# Patient Record
Sex: MALE | Race: WHITE | NOT HISPANIC OR LATINO | Employment: FULL TIME | URBAN - METROPOLITAN AREA
[De-identification: names, ages, dates, MRNs, and addresses within clinical notes are randomized per-mention and may not be internally consistent; named-entity substitution may affect disease eponyms.]

---

## 2023-11-02 ENCOUNTER — PATIENT OUTREACH (OUTPATIENT)
Dept: CARE COORDINATION | Facility: CLINIC | Age: 53
End: 2023-11-02
Payer: MEDICARE

## 2023-11-02 ENCOUNTER — DOCUMENTATION (OUTPATIENT)
Dept: PRIMARY CARE | Facility: CLINIC | Age: 53
End: 2023-11-02
Payer: MEDICARE

## 2023-11-02 RX ORDER — AMLODIPINE BESYLATE 2.5 MG/1
2.5 TABLET ORAL DAILY
COMMUNITY
End: 2023-11-14 | Stop reason: ALTCHOICE

## 2023-11-02 RX ORDER — ATORVASTATIN CALCIUM 40 MG/1
40 TABLET, FILM COATED ORAL DAILY
COMMUNITY

## 2023-11-02 NOTE — PROGRESS NOTES
Discharge Facility:[facility]  Discharge Diagnosis:  Cervical Spondylosis w/cervical stenosis, radiculopathy  HTN  Tobacco Abuse  Depression  Obesity    Admission Date:10/30/2023  Discharge Date: 11/1/2023    PCP Appointment Date:TBD  Specialist Appointment Date: D  Hospital Encounter and Summary:  not available at this time  See discharge assessment below for further details  Engagement  Call Start Time: 1534 (11/2/2023  3:41 PM)    Medications  Medications reviewed with patient/caregiver?: Yes (Amlodipine 2.5 mg one qd, Atorvastatin 40 mg one qd) (11/2/2023  3:41 PM)  Is the patient having any side effects they believe may be caused by any medication additions or changes?: No (11/2/2023  3:41 PM)  Does the patient have all medications ordered at discharge?: Yes (11/2/2023  3:41 PM)  Care Management Interventions: No intervention needed (11/2/2023  3:41 PM)  Is the patient taking all medications as directed (includes completed medication regime)?: Yes (11/2/2023  3:41 PM)    Appointments  Does the patient have a primary care provider?: Yes (11/2/2023  3:41 PM)  Has the patient kept scheduled appointments due by today?: Yes (11/2/2023  3:41 PM)    Patient Teaching  Does the patient have access to their discharge instructions?: Yes (11/2/2023  3:41 PM)  What is the patient's perception of their health status since discharge?: Improving (11/2/2023  3:41 PM)  Is the patient/caregiver able to teach back the hierarchy of who to call/visit for symptoms/problems? PCP, Specialist, Home Health nurse, Urgent Care, ED, 911: Yes (11/2/2023  3:41 PM)  If the patient is a current smoker, are they able to teach back resources for cessation?: [id number] (We discussed to try to cut back one every 5 or so days and merle on calendar to see progress, will think about) (11/2/2023  3:41 PM)    Wrap Up  Wrap Up Additional Comments: -- (Cb dealing with trying to with multiple addictions currently. He is giving recovery a try.  He will fu with pcp.) (11/2/2023  3:41 PM)

## 2023-11-07 ENCOUNTER — TELEPHONE (OUTPATIENT)
Dept: PRIMARY CARE | Facility: CLINIC | Age: 53
End: 2023-11-07
Payer: COMMERCIAL

## 2023-11-07 NOTE — TELEPHONE ENCOUNTER
----- Message from Brigitte Casanova MA sent at 11/2/2023  3:57 PM EDT -----  Regarding: TCM Needs Appointment  This patient was discharged from: [facility]  Discharge diagnosis:   Cervical Spondylosis w/cervical stenosis, radiculopathy, HTN, Tobacco Abuse, Depression, Obesity  Date of discharge: 11/1/2023       No PCP appointments available within 14 days of discharge.   Please reach out to patient and schedule an appointment by 11/15/2023    Thank You!

## 2023-11-14 ENCOUNTER — OFFICE VISIT (OUTPATIENT)
Dept: PRIMARY CARE | Facility: CLINIC | Age: 53
End: 2023-11-14
Payer: MEDICARE

## 2023-11-14 VITALS — HEART RATE: 88 BPM | HEIGHT: 72 IN | SYSTOLIC BLOOD PRESSURE: 140 MMHG | DIASTOLIC BLOOD PRESSURE: 84 MMHG

## 2023-11-14 DIAGNOSIS — F17.200 SMOKER: ICD-10-CM

## 2023-11-14 DIAGNOSIS — G45.9 TIA (TRANSIENT ISCHEMIC ATTACK): Primary | ICD-10-CM

## 2023-11-14 DIAGNOSIS — E78.2 MIXED HYPERLIPIDEMIA: ICD-10-CM

## 2023-11-14 DIAGNOSIS — M48.02 CERVICAL STENOSIS OF SPINAL CANAL: ICD-10-CM

## 2023-11-14 DIAGNOSIS — F19.10 DRUG ABUSE (MULTI): ICD-10-CM

## 2023-11-14 DIAGNOSIS — Z00.00 WELLNESS EXAMINATION: ICD-10-CM

## 2023-11-14 PROCEDURE — 99213 OFFICE O/P EST LOW 20 MIN: CPT | Performed by: INTERNAL MEDICINE

## 2023-11-14 PROCEDURE — 4004F PT TOBACCO SCREEN RCVD TLK: CPT | Performed by: INTERNAL MEDICINE

## 2023-11-14 RX ORDER — TAMSULOSIN HYDROCHLORIDE 0.4 MG/1
0.4 CAPSULE ORAL EVERY 24 HOURS
COMMUNITY
Start: 2016-09-14 | End: 2023-11-14 | Stop reason: ALTCHOICE

## 2023-11-14 RX ORDER — VENLAFAXINE HYDROCHLORIDE 150 MG/1
150 CAPSULE, EXTENDED RELEASE ORAL EVERY 24 HOURS
COMMUNITY
Start: 2019-10-30

## 2023-11-14 RX ORDER — AMLODIPINE BESYLATE 2.5 MG/1
2.5 TABLET ORAL
COMMUNITY
Start: 2023-11-02

## 2023-11-14 RX ORDER — ATORVASTATIN CALCIUM 40 MG/1
40 TABLET, FILM COATED ORAL DAILY
COMMUNITY
Start: 2023-11-01 | End: 2023-11-14 | Stop reason: ALTCHOICE

## 2023-11-14 NOTE — PROGRESS NOTES
Subjective   Patient ID: Cb Richardson is a 53 y.o. male who presents for Follow-up (Hospital follow up, pain and numbness on right side of body).    HPI numbness   See [facility] mri  ct cta neg   [facility] has some cervical spine stenosis     Review of Systems    Objective   /84 (BP Location: Left arm, Patient Position: Sitting, BP Cuff Size: Adult)   Pulse 88   Ht 1.829 m (6')     Physical Exam  Obese   Card rrr  Pulm cta  Abd soft nt bs plus     Assessment/Plan   Diagnoses and all orders for this visit:  TIA (transient ischemic attack)  Comments:  see n[facility]ols [facility]  Cervical stenosis of spinal canal  Comments:  will follow try pt  Smoker  Comments:  ongoing  Mixed hyperlipidemia  Comments:  on meds  Drug abuse (CMS/HCC)  Comments:  trying

## 2023-11-16 ENCOUNTER — PATIENT OUTREACH (OUTPATIENT)
Dept: PRIMARY CARE | Facility: CLINIC | Age: 53
End: 2023-11-16
Payer: MEDICARE

## 2023-11-16 NOTE — PROGRESS NOTES
Call regarding appt. with PCP on 11/14/2023 after hospitalization.  At time of outreach call the patient feels as if their condition has improved since last visit.  Reviewed the PCP appointment with the pt and addressed any questions or concerns.  Cb states he is doing pretty good. Taking meds as directed.

## 2024-01-24 ENCOUNTER — PATIENT OUTREACH (OUTPATIENT)
Dept: CARE COORDINATION | Facility: CLINIC | Age: 54
End: 2024-01-24

## 2024-01-24 ENCOUNTER — EVALUATION (OUTPATIENT)
Dept: PHYSICAL THERAPY | Facility: CLINIC | Age: 54
End: 2024-01-24
Payer: MEDICARE

## 2024-01-24 DIAGNOSIS — M48.02 CERVICAL STENOSIS OF SPINAL CANAL: Primary | ICD-10-CM

## 2024-01-24 PROCEDURE — 97110 THERAPEUTIC EXERCISES: CPT | Mod: GP | Performed by: PHYSICAL THERAPIST

## 2024-01-24 PROCEDURE — 97162 PT EVAL MOD COMPLEX 30 MIN: CPT | Mod: GP | Performed by: PHYSICAL THERAPIST

## 2024-01-24 ASSESSMENT — PATIENT HEALTH QUESTIONNAIRE - PHQ9
2. FEELING DOWN, DEPRESSED OR HOPELESS: SEVERAL DAYS
2. FEELING DOWN, DEPRESSED OR HOPELESS: SEVERAL DAYS
1. LITTLE INTEREST OR PLEASURE IN DOING THINGS: NOT AT ALL
10. IF YOU CHECKED OFF ANY PROBLEMS, HOW DIFFICULT HAVE THESE PROBLEMS MADE IT FOR YOU TO DO YOUR WORK, TAKE CARE OF THINGS AT HOME, OR GET ALONG WITH OTHER PEOPLE: SOMEWHAT DIFFICULT
SUM OF ALL RESPONSES TO PHQ9 QUESTIONS 1 AND 2: 1
SUM OF ALL RESPONSES TO PHQ9 QUESTIONS 1 AND 2: 1
1. LITTLE INTEREST OR PLEASURE IN DOING THINGS: NOT AT ALL

## 2024-01-24 ASSESSMENT — ENCOUNTER SYMPTOMS
DEPRESSION: 0
OCCASIONAL FEELINGS OF UNSTEADINESS: 0
LOSS OF SENSATION IN FEET: 0

## 2024-01-24 NOTE — PROGRESS NOTES
Call after hospitalization.  At time of outreach call the patient feels as if their condition has improved since last visit.  Cb states having some legal issues. He went to [facility] today but concerned about the other issues now.

## 2024-01-24 NOTE — PROGRESS NOTES
Physical Therapy    Physical Therapy Evaluation    Patient Name: Cb Richardson  MRN: [id number]  Today's Date: 1/24/2024  Visit #1         Current Problem  Problem List Items Addressed This Visit             ICD-10-CM    Cervical stenosis of spinal canal - Primary M48.02         SUBJECTIVE:  Subjective   General  Reason for Referral: cerv stenosis  Referred By: Kay  Payor: JAVED AGUIRRE [facility] / Plan: [facility] [place] / Product Type: *No Product type* /   Precautions  Precautions  STEADI Fall Risk Score (The score of 4 or more indicates an increased risk of falling): 3  Precautions Comment: none     Pain       Chief complaint: Pt reports symptoms began a couple  months ago he was getting feelings of his entire R side getting numb from his chest down the middle and over.  Numb R UE and LE.  Went to [facility] ER where he was scanned for TIA but found to have cervical stenosis.  Per MRI images he does have disc herniations encroaching on the thecal sac. Pt with reports of having difficulty concentrating and feeling foggy.     Pain ranges: 1-3/10 pain in joints with arthritis maybe but no pain in his neck.   Increases with: sitting and watching TV in bed  Decreases with: sleeping with less pillows  Prior level of function: Ind with ADLs  Current functional level:  Pain limits the patient's ability to concentrate  Home set up: no concerns  Work status: not a working individual    OBJECTIVE  Upper Extremity Strength:  Date: eval RIGHT LEFT   Shoulder Flexion 4 4+   Shoulder Abduction 4 4+   Shoulder ER 4 4+   Shoulder IR 4 4+     Cervical ROM:  Date: eval Percentage    Flexion 35    Extension 55     RIGHT LEFT   Side bend 20  30 pulling on R   Rotation       Joint mobility NT  Posture forward head and rounded shoulders  Palpation NT  Neurological symptoms tingling on R but resolving    Outcome Measures:  Other Measures  Neck Disability Index: 28%     ASSESSMENT  The pt presents with signs and symptoms  consistent with the medical diagnosis of cervical stenosis  The pt has impairments including resolving numbness R UE and LE, pain in multiple joints and functional limitations which include a sedentary lifestyle with poor posture.  The pt will benefit from skilled physical therapy to reduce impairments in order to return to prior level of function.     The physical therapy prognosis is good for the patient to achieve their goals.   The pt tolerated therapy treatment today well with no adverse effects.  Barriers to therapy include:  history of drug abuse, pt has just gotten out  of the rehab facility,     PLAN  The pt will be seen 1 days a week for 4 weeks.  HOWEVER pt scheduled to return to a hearing to determine if he needs to go back to rehab on 1/29/24.  Pt will call to schedule if he is able  Medicare certification period: 1/24/24-3/24/24     The pt has been educated about the risks and benefits of physical therapy including manual therapy treatments and gives consent for treatment.     The patient will benefit from physical therapy treatment to include: therapeutic exercises, therapeutic activities, neurological re-education, manual therapy, modalities, and a home exercise program.     The patient's potential to reach their therapy goals is excellent.     The evaluating therapist is prn and therefore the pt's POC may be re-assessed or discharged by another staff therapist at this location based on scheduling and availability.  Pt is aware and agrees.     TREATMENT:  Therapeutic ex:  Seated cervical AROM  Scap retractions x 20  Cervical retractions x 20  Increased time educating patient about posture and not being sedentary.   Encouraged a walking program total of 30 min per day  Goals:  Active       PT Problem       pt will demonstrate improved understanding of posture and regular mobility       Start:  01/24/24    Expected End:  02/23/24            PT will have no numbness R UE/LE for 2 weeks       Start:   01/24/24    Expected End:  02/23/24            NDI to 14%       Start:  01/24/24    Expected End:  02/23/24

## 2024-01-31 ENCOUNTER — PATIENT OUTREACH (OUTPATIENT)
Dept: PRIMARY CARE | Facility: CLINIC | Age: 54
End: 2024-01-31
Payer: MEDICARE

## 2024-02-10 DIAGNOSIS — N40.1 BENIGN PROSTATIC HYPERPLASIA WITH LOWER URINARY TRACT SYMPTOMS: ICD-10-CM

## 2024-02-12 RX ORDER — TAMSULOSIN HYDROCHLORIDE 0.4 MG/1
0.4 CAPSULE ORAL NIGHTLY
Qty: 90 CAPSULE | Refills: 1 | Status: SHIPPED | OUTPATIENT
Start: 2024-02-12

## 2024-04-03 ENCOUNTER — DOCUMENTATION (OUTPATIENT)
Dept: PHYSICAL THERAPY | Facility: CLINIC | Age: 54
End: 2024-04-03
Payer: MEDICARE

## 2024-04-03 NOTE — PROGRESS NOTES
Physical Therapy    Discharge Summary    Name: Cb Richardson  MRN: [id number]  : 1970  Date: 24    Discharge Summary: PT    Discharge Information:     Therapy Summary: Eval only.  PT never called to schedule more appointments    Discharge Status: unknown    Rehab Discharge Reason: Failed to schedule and/or keep follow-up appointment(s)

## 2024-04-19 ENCOUNTER — DOCUMENTATION (OUTPATIENT)
Dept: PHYSICAL THERAPY | Facility: CLINIC | Age: 54
End: 2024-04-19
Payer: MEDICARE

## 2024-04-25 ENCOUNTER — TELEPHONE (OUTPATIENT)
Dept: PRIMARY CARE | Facility: CLINIC | Age: 54
End: 2024-04-25
Payer: MEDICARE

## 2024-04-25 NOTE — TELEPHONE ENCOUNTER
Pt needs a statement/letter clearing him/ okay for residential treatment at [facility]-for  drug and alcohol.  F: [phone]    Okay to write?

## 2024-06-13 ENCOUNTER — TELEPHONE (OUTPATIENT)
Dept: PRIMARY CARE | Facility: CLINIC | Age: 54
End: 2024-06-13
Payer: MEDICARE

## 2024-06-13 NOTE — TELEPHONE ENCOUNTER
Patient has appt with you June 24 but is requesting a letter stating he needs additional bathroom breaks due to medication.     [facility]

## 2024-06-14 ENCOUNTER — HOSPITAL ENCOUNTER (EMERGENCY)
Facility: HOSPITAL | Age: 54
Discharge: LEFT BEFORE DISCHARGE | End: 2024-06-14
Attending: STUDENT IN AN ORGANIZED HEALTH CARE EDUCATION/TRAINING PROGRAM
Payer: MEDICARE

## 2024-06-14 VITALS
OXYGEN SATURATION: 96 % | TEMPERATURE: 98.1 F | SYSTOLIC BLOOD PRESSURE: 174 MMHG | HEART RATE: 81 BPM | RESPIRATION RATE: 18 BRPM | DIASTOLIC BLOOD PRESSURE: 95 MMHG

## 2024-06-14 DIAGNOSIS — R45.1 AGITATION: Primary | ICD-10-CM

## 2024-06-14 PROCEDURE — 99283 EMERGENCY DEPT VISIT LOW MDM: CPT | Performed by: STUDENT IN AN ORGANIZED HEALTH CARE EDUCATION/TRAINING PROGRAM

## 2024-06-14 PROCEDURE — 36415 COLL VENOUS BLD VENIPUNCTURE: CPT | Performed by: STUDENT IN AN ORGANIZED HEALTH CARE EDUCATION/TRAINING PROGRAM

## 2024-06-14 RX ORDER — IBUPROFEN 200 MG
1 TABLET ORAL DAILY
Status: DISCONTINUED | OUTPATIENT
Start: 2024-06-14 | End: 2024-06-14 | Stop reason: HOSPADM

## 2024-06-14 NOTE — ED PROVIDER NOTES
EMERGENCY MEDICINE EVALUATION NOTE    History of Present Illness     Chief Complaint: No chief complaint on file.      HPI: Cb Richadrson is a 54 y.o. male with past medical history of cocaine and alcohol abuse who presents with complaint of possible need for psych eval. Patient was reportedly at a group home house.  Patient states he was sent in by his PPH after an argument with them.  Patient denies any recent drug use or alcohol use.  History is somewhat limited as patient is agitated.  He otherwise denies any SI or HI.  Denies any visual or auditory hallucinations.  He states he does take Effexor for and has been compliant with this.  He states he does not want to talk to a psychiatric provider and in the left as he wanted to get out of the house.  He has no other complaints at this time and wants to sign out AMA.    Previous History   No past medical history on file.  No past surgical history on file.  Social History     Tobacco Use    Smoking status: Every Day     Current packs/day: 2.00     Types: Cigarettes     Passive exposure: Current    Smokeless tobacco: Never   Substance Use Topics    Alcohol use: Not Currently     Comment: 2 weeks sober    Drug use: Yes     Types: Cocaine, Marijuana     Comment: 1.5 month clean from cocaine, 1 week clean from marijuana     No family history on file.  No Known Allergies  Current Outpatient Medications   Medication Instructions    amLODIPine (NORVASC) 2.5 mg, oral, Daily RT    atorvastatin (LIPITOR) 40 mg, oral, Daily    tamsulosin (FLOMAX) 0.4 mg, oral, Nightly    venlafaxine XR (EFFEXOR-XR) 150 mg, oral, Every 24 hours       Physical Exam     Appearance: Alert, oriented , cooperative,  in acute distress. Well nourished & well hydrated.     Skin: Intact,  dry skin, no lesions, rash, petechiae or purpura.      Eyes: PERRLA, EOMs intact,  Conjunctiva pink with no redness or exudates. Cornea & anterior chamber are clear, Eyelids without lesions. No scleral icterus.       ENT: Hearing grossly intact. External auditory canals patent, tympanic membranes intact with visible landmarks. Nares patent, mucus membranes moist. Dentition without lesions. Pharynx clear, uvula midline.      Neck: Supple, without meningismus. Thyroid not palpable. Trachea at midline. No lymphadenopathy.     Pulmonary: Clear bilaterally with good chest wall excursion. No rales, rhonchi or wheezing. No accessory muscle use or stridor.     Cardiac: Normal S1, S2 without murmur, rub, gallop or extrasystole. No JVD, Carotids without bruits.     Abdomen: Soft, nontender, active bowel sounds.  No palpable organomegaly.  No rebound or guarding.  No CVA tenderness.     Genitourinary: Exam deferred.     Musculoskeletal: Full range of motion. no pain, edema, or deformity. Pulses full and equal. No cyanosis or clubbing.      Neurological:  Cranial nerves II through XII are grossly intact, finger-nose touch is normal, normal sensation, no weakness, no focal findings identified.     Psychiatric: Mildly agitated.     Results     Labs Reviewed   CBC WITH AUTO DIFFERENTIAL   COMPREHENSIVE METABOLIC PANEL   DRUG SCREEN,URINE   ACUTE TOXICOLOGY PANEL, BLOOD     No orders to display         ED Course & Medical Decision Making     Medications   nicotine (Nicoderm CQ) 14 mg/24 hr patch 1 patch (has no administration in time range)     Diagnoses as of 06/14/24 1602   Agitation           Cb Richardson is a 54 y.o. male with past medical history of cocaine and alcohol abuse who presents with complaint of possible need for psych eval. patient without any active SI or HI or reported active auditory visual hallucinations.  No evidence of acute psychosis although he is somewhat agitated on clinical examination.  Patient was adamant that he does not want to speak with any psychiatric provider and refused to remove any of his belongings.  I do not believe he is at risk of harming himself or others at this time and is stable for  discharge home with no evidence of acute psychosis or need for pink slip this time.  Patient ultimately left AMA.    Procedures   Procedures    Diagnosis     1. Agitation        Disposition     This patient has elected to leave against medical advice. In my opinion, the patient has capacity to leave AMA. The patient is clinically sober, free from distracting injury, appears to have intact insight and judgment and reason, and in my opinion has capacity to make decisions. I explained to the patient that these symptoms may represent a serious underlying medical condition  and the patient verbalized understanding of my concerns and understands the consequences of leaving without complete evaluation.    I had a discussion with the patient about their workup and results, and informed the patient what the next step in diagnosis and treatment would be, and they verbalized understanding of this as well. I explained the risks of leaving without further workup or treatment, which included reasonably foreseeable complications such as death, serious injury, and permanent disability. I also offered alternatives to departing AMA such as assigning the patient a different provider or an alternate workup pathway.    I have asked them to return as soon as possible to complete their evaluation, and also explained that they were welcome to return to the ER for further evaluation whenever they choose. I have asked the patient to follow up with their primary doctor as soon as possible. I have answered all their questions.       Jc Schofield DO   Emergency Medicine       ED Prescriptions    None            Jc Schofield DO  06/14/24 1604

## 2024-06-24 ENCOUNTER — APPOINTMENT (OUTPATIENT)
Dept: PRIMARY CARE | Facility: CLINIC | Age: 54
End: 2024-06-24
Payer: MEDICARE

## 2024-06-24 VITALS
WEIGHT: 279.2 LBS | HEART RATE: 78 BPM | BODY MASS INDEX: 39.09 KG/M2 | SYSTOLIC BLOOD PRESSURE: 122 MMHG | OXYGEN SATURATION: 97 % | HEIGHT: 71 IN | TEMPERATURE: 97.3 F | DIASTOLIC BLOOD PRESSURE: 80 MMHG

## 2024-06-24 DIAGNOSIS — E78.2 MIXED HYPERLIPIDEMIA: ICD-10-CM

## 2024-06-24 DIAGNOSIS — F17.200 SMOKER: ICD-10-CM

## 2024-06-24 DIAGNOSIS — F19.10 DRUG ABUSE (MULTI): ICD-10-CM

## 2024-06-24 DIAGNOSIS — M48.02 CERVICAL STENOSIS OF SPINAL CANAL: ICD-10-CM

## 2024-06-24 DIAGNOSIS — Z12.5 PROSTATE CANCER SCREENING: ICD-10-CM

## 2024-06-24 DIAGNOSIS — Z00.00 WELLNESS EXAMINATION: ICD-10-CM

## 2024-06-24 DIAGNOSIS — G45.9 TIA (TRANSIENT ISCHEMIC ATTACK): ICD-10-CM

## 2024-06-24 DIAGNOSIS — M79.662 PAIN OF LEFT CALF: Primary | ICD-10-CM

## 2024-06-24 PROCEDURE — 99213 OFFICE O/P EST LOW 20 MIN: CPT | Performed by: INTERNAL MEDICINE

## 2024-06-24 RX ORDER — CLONIDINE HYDROCHLORIDE 0.1 MG/1
0.1 TABLET ORAL 2 TIMES DAILY
COMMUNITY
Start: 2024-05-16

## 2024-06-24 RX ORDER — VIT B1 MN/B2/B3/B5/B6/B12/C/FA 18-250-400
TABLET ORAL
COMMUNITY
Start: 2024-05-19

## 2024-06-24 NOTE — PROGRESS NOTES
"Subjective   Patient ID: Cb Richardson is a 54 y.o. male who presents for Follow-up (Discuss getting referral to Urologist).    HPI desires colonoscopy   He is back in rehab  Left calf pain   Feels like an ice pick   No clot    Review of Systems    Objective   /80 (BP Location: Left arm, Patient Position: Sitting, BP Cuff Size: Large adult)   Pulse 78   Temp 36.3 °C (97.3 °F)   Ht 1.803 m (5' 11\")   Wt 127 kg (279 lb 3.2 oz)   SpO2 97%   BMI 38.94 kg/m²     Physical Exam  NAD  CARD RRR  PULM CTA  ABD NEG   EXT  NL    Assessment/Plan   Diagnoses and all orders for this visit:  Pain of left calf  Comments:  stretch  Mixed hyperlipidemia  Comments:  on meds  Smoker  Cervical stenosis of spinal canal  Comments:  ongoing  TIA (transient ischemic attack)  Comments:  on meds  Drug abuse (Multi)  Comments:  ongoing         "

## 2024-08-20 DIAGNOSIS — G45.9 TIA (TRANSIENT ISCHEMIC ATTACK): Primary | ICD-10-CM

## 2024-08-20 DIAGNOSIS — N40.1 BENIGN PROSTATIC HYPERPLASIA WITH LOWER URINARY TRACT SYMPTOMS: ICD-10-CM

## 2024-08-20 DIAGNOSIS — F17.200 SMOKER: Primary | ICD-10-CM

## 2024-08-20 DIAGNOSIS — M48.02 CERVICAL STENOSIS OF SPINAL CANAL: ICD-10-CM

## 2024-08-20 DIAGNOSIS — E78.2 MIXED HYPERLIPIDEMIA: ICD-10-CM

## 2024-08-20 RX ORDER — ALBUTEROL SULFATE 90 UG/1
2 INHALANT RESPIRATORY (INHALATION) EVERY 4 HOURS PRN
Qty: 24 G | Refills: 1 | Status: SHIPPED | OUTPATIENT
Start: 2024-08-20 | End: 2025-08-20

## 2024-08-20 RX ORDER — VENLAFAXINE HYDROCHLORIDE 150 MG/1
150 CAPSULE, EXTENDED RELEASE ORAL EVERY 24 HOURS
Qty: 90 CAPSULE | Refills: 1 | Status: SHIPPED | OUTPATIENT
Start: 2024-08-20

## 2024-08-20 RX ORDER — AMLODIPINE BESYLATE 2.5 MG/1
2.5 TABLET ORAL
Qty: 90 TABLET | Refills: 1 | Status: SHIPPED | OUTPATIENT
Start: 2024-08-20

## 2024-08-20 RX ORDER — TAMSULOSIN HYDROCHLORIDE 0.4 MG/1
0.4 CAPSULE ORAL NIGHTLY
Qty: 90 CAPSULE | Refills: 1 | Status: SHIPPED | OUTPATIENT
Start: 2024-08-20

## 2024-08-20 RX ORDER — ATORVASTATIN CALCIUM 40 MG/1
40 TABLET, FILM COATED ORAL DAILY
Qty: 90 TABLET | Refills: 1 | Status: SHIPPED | OUTPATIENT
Start: 2024-08-20

## 2024-08-20 NOTE — TELEPHONE ENCOUNTER
Patient last visit 06/24/24 - in addition to the pended meds, he is asking to restart Albuterol - has not had refilled since 2022.  Would like meds sent to [facility]

## 2024-10-07 DIAGNOSIS — E78.2 MIXED HYPERLIPIDEMIA: ICD-10-CM

## 2024-10-07 DIAGNOSIS — G45.9 TIA (TRANSIENT ISCHEMIC ATTACK): ICD-10-CM

## 2024-10-07 DIAGNOSIS — N40.1 BENIGN PROSTATIC HYPERPLASIA WITH LOWER URINARY TRACT SYMPTOMS: ICD-10-CM

## 2024-10-07 DIAGNOSIS — M48.02 CERVICAL STENOSIS OF SPINAL CANAL: ICD-10-CM

## 2024-10-07 RX ORDER — AMLODIPINE BESYLATE 2.5 MG/1
2.5 TABLET ORAL
Qty: 90 TABLET | Refills: 1 | Status: SHIPPED | OUTPATIENT
Start: 2024-10-07

## 2024-11-26 ENCOUNTER — APPOINTMENT (OUTPATIENT)
Dept: PRIMARY CARE | Facility: CLINIC | Age: 54
End: 2024-11-26
Payer: MEDICARE

## 2024-11-26 VITALS
DIASTOLIC BLOOD PRESSURE: 80 MMHG | WEIGHT: 277.6 LBS | HEART RATE: 75 BPM | TEMPERATURE: 97 F | BODY MASS INDEX: 38.72 KG/M2 | OXYGEN SATURATION: 96 % | SYSTOLIC BLOOD PRESSURE: 142 MMHG

## 2024-11-26 DIAGNOSIS — E78.2 MIXED HYPERLIPIDEMIA: ICD-10-CM

## 2024-11-26 DIAGNOSIS — Z00.00 WELLNESS EXAMINATION: Primary | ICD-10-CM

## 2024-11-26 DIAGNOSIS — F17.200 SMOKER: ICD-10-CM

## 2024-11-26 DIAGNOSIS — M48.02 CERVICAL STENOSIS OF SPINAL CANAL: ICD-10-CM

## 2024-11-26 DIAGNOSIS — K02.9 DENTAL CARIES: ICD-10-CM

## 2024-11-26 DIAGNOSIS — G45.9 TIA (TRANSIENT ISCHEMIC ATTACK): ICD-10-CM

## 2024-11-26 DIAGNOSIS — F19.10 DRUG ABUSE (MULTI): ICD-10-CM

## 2024-11-26 DIAGNOSIS — Z12.5 PROSTATE CANCER SCREENING: ICD-10-CM

## 2024-11-26 PROCEDURE — 99396 PREV VISIT EST AGE 40-64: CPT | Performed by: INTERNAL MEDICINE

## 2024-11-26 RX ORDER — AMOXICILLIN 500 MG/1
TABLET, FILM COATED ORAL
COMMUNITY
Start: 2024-09-30

## 2024-11-26 NOTE — PROGRESS NOTES
Subjective   Patient ID: Cb Richardson is a 54 y.o. male who presents for Pre-op Exam (Dental procedure).    HPI he smokes   He drinks some   He uses some crack    He changes his meds  He does not get any chest pain  He is very minimal active   Urine is ok         Review of Systems    Objective   /80   Pulse 75   Temp 36.1 °C (97 °F) (Temporal)   Wt 126 kg (277 lb 9.6 oz)   SpO2 96%   BMI 38.72 kg/m²     Physical Exam  NAD  CARD RRR  PULM copd   ABD NEG   EXT  NL    Assessment/Plan   Diagnoses and all orders for this visit:  Wellness examination  -     CBC; Future  -     Lipid Panel; Future  -     Comprehensive Metabolic Panel; Future  -     TSH with reflex to Free T4 if abnormal; Future  -     Urinalysis with Reflex Culture and Microscopic; Future  Prostate cancer screening  -     Prostate Specific Antigen, Screen; Future  -     TSH with reflex to Free T4 if abnormal; Future  -     Urinalysis with Reflex Culture and Microscopic; Future  Cervical stenosis of spinal canal  -     TSH with reflex to Free T4 if abnormal; Future  -     Urinalysis with Reflex Culture and Microscopic; Future  TIA (transient ischemic attack)  -     TSH with reflex to Free T4 if abnormal; Future  -     Urinalysis with Reflex Culture and Microscopic; Future  Mixed hyperlipidemia  -     TSH with reflex to Free T4 if abnormal; Future  -     Urinalysis with Reflex Culture and Microscopic; Future  Smoker  -     TSH with reflex to Free T4 if abnormal; Future  -     Urinalysis with Reflex Culture and Microscopic; Future  Drug abuse (Multi)  -     TSH with reflex to Free T4 if abnormal; Future  -     Urinalysis with Reflex Culture and Microscopic; Future  Dental caries  BMI 38.0-38.9,adult    He needs blood work if ok then cleared

## 2024-12-02 DIAGNOSIS — E78.2 MIXED HYPERLIPIDEMIA: ICD-10-CM

## 2024-12-02 DIAGNOSIS — F17.200 SMOKER: ICD-10-CM

## 2024-12-02 DIAGNOSIS — M48.02 CERVICAL STENOSIS OF SPINAL CANAL: ICD-10-CM

## 2024-12-02 DIAGNOSIS — N40.1 BENIGN PROSTATIC HYPERPLASIA WITH LOWER URINARY TRACT SYMPTOMS: ICD-10-CM

## 2024-12-02 DIAGNOSIS — G45.9 TIA (TRANSIENT ISCHEMIC ATTACK): ICD-10-CM

## 2024-12-03 ENCOUNTER — TELEPHONE (OUTPATIENT)
Dept: PRIMARY CARE | Facility: CLINIC | Age: 54
End: 2024-12-03
Payer: MEDICARE

## 2024-12-03 DIAGNOSIS — E78.2 MIXED HYPERLIPIDEMIA: ICD-10-CM

## 2024-12-03 DIAGNOSIS — G45.9 TIA (TRANSIENT ISCHEMIC ATTACK): ICD-10-CM

## 2024-12-03 DIAGNOSIS — F17.200 SMOKER: ICD-10-CM

## 2024-12-03 DIAGNOSIS — N40.1 BENIGN PROSTATIC HYPERPLASIA WITH LOWER URINARY TRACT SYMPTOMS: ICD-10-CM

## 2024-12-03 DIAGNOSIS — M48.02 CERVICAL STENOSIS OF SPINAL CANAL: ICD-10-CM

## 2024-12-03 RX ORDER — TAMSULOSIN HYDROCHLORIDE 0.4 MG/1
CAPSULE ORAL
Refills: 0 | OUTPATIENT
Start: 2024-12-03

## 2024-12-03 RX ORDER — AMLODIPINE BESYLATE 2.5 MG/1
TABLET ORAL
Refills: 0 | OUTPATIENT
Start: 2024-12-03

## 2024-12-03 RX ORDER — ATORVASTATIN CALCIUM 40 MG/1
40 TABLET, FILM COATED ORAL DAILY
Qty: 90 TABLET | Refills: 1 | Status: SHIPPED | OUTPATIENT
Start: 2024-12-03

## 2024-12-03 RX ORDER — ATORVASTATIN CALCIUM 40 MG/1
TABLET, FILM COATED ORAL
Refills: 0 | OUTPATIENT
Start: 2024-12-03

## 2024-12-03 RX ORDER — AMLODIPINE BESYLATE 2.5 MG/1
2.5 TABLET ORAL
Qty: 90 TABLET | Refills: 1 | Status: SHIPPED | OUTPATIENT
Start: 2024-12-03

## 2024-12-03 RX ORDER — ALBUTEROL SULFATE 90 UG/1
INHALANT RESPIRATORY (INHALATION)
Refills: 0 | OUTPATIENT
Start: 2024-12-03

## 2024-12-03 RX ORDER — TAMSULOSIN HYDROCHLORIDE 0.4 MG/1
0.4 CAPSULE ORAL NIGHTLY
Qty: 90 CAPSULE | Refills: 1 | Status: SHIPPED | OUTPATIENT
Start: 2024-12-03

## 2024-12-03 RX ORDER — ALBUTEROL SULFATE 90 UG/1
2 INHALANT RESPIRATORY (INHALATION) EVERY 4 HOURS PRN
Qty: 24 G | Refills: 1 | Status: SHIPPED | OUTPATIENT
Start: 2024-12-03 | End: 2025-12-03

## 2024-12-03 NOTE — TELEPHONE ENCOUNTER
Patient needs these meds sent to [facility] ( was [facility])    Albuterol  Amlodipine  Atorvastatin  Tamsulosin

## 2024-12-11 ENCOUNTER — TELEPHONE (OUTPATIENT)
Dept: PRIMARY CARE | Facility: CLINIC | Age: 54
End: 2024-12-11
Payer: MEDICARE

## 2024-12-11 NOTE — TELEPHONE ENCOUNTER
----- Message from Hussein Villanueva sent at 12/11/2024  8:23 AM EST -----  Labs normal OK for OR EXCEPT POOR lipids low fat diet repeat 6 mo  ----- Message -----  From: Annamaria Huynh  Sent: 12/10/2024   9:59 AM EST  To: Hussein Villanueva MD    Pt wanting to know bw, to be cleared for surgery on teeth

## 2025-01-20 ENCOUNTER — APPOINTMENT (OUTPATIENT)
Dept: PRIMARY CARE | Facility: CLINIC | Age: 55
End: 2025-01-20
Payer: MEDICARE

## 2025-01-21 ENCOUNTER — APPOINTMENT (OUTPATIENT)
Dept: PRIMARY CARE | Facility: CLINIC | Age: 55
End: 2025-01-21
Payer: MEDICARE

## 2025-05-06 ENCOUNTER — PATIENT OUTREACH (OUTPATIENT)
Dept: PRIMARY CARE | Facility: CLINIC | Age: 55
End: 2025-05-06
Payer: MEDICARE

## 2025-05-06 NOTE — PROGRESS NOTES
Discharge Facility:  [place]   Discharge Diagnosis:  Wound infection   Admission Date:  5/2/25  Discharge Date:  5/3/25  AMA    PCP Appointment Date:  Specialist Appointment Date: wound check 5/6/25   Hospital Encounter and Summary Linked: Yes    Unable to reach Patient x 2.  LVM requesting return call.

## 2025-05-08 DIAGNOSIS — M48.02 CERVICAL STENOSIS OF SPINAL CANAL: Primary | ICD-10-CM

## 2025-05-08 RX ORDER — NAPROXEN 375 MG/1
375 TABLET ORAL
Qty: 30 TABLET | Refills: 0 | Status: SHIPPED | OUTPATIENT
Start: 2025-05-08

## 2025-05-20 ENCOUNTER — PATIENT OUTREACH (OUTPATIENT)
Dept: PRIMARY CARE | Facility: CLINIC | Age: 55
End: 2025-05-20
Payer: MEDICARE

## 2025-05-20 NOTE — PROGRESS NOTES
"Confirmation of at least 2 patient identifiers.    Completed telephonic follow-up with patient approximately 14 days post discharge, no PCP follow up.   Spoke to patient during outreach call.    Patient reports feeling: \"a little bit better\"    Patient has questions or concerns about medications: No    Have all prescribed medications been filled? Yes    Patient has necessary resources to manage their care? Yes   Cm scheduled pcp appt at pts request   Care  information provided to patient.                   "

## 2025-05-27 ENCOUNTER — APPOINTMENT (OUTPATIENT)
Dept: PRIMARY CARE | Facility: CLINIC | Age: 55
End: 2025-05-27
Payer: MEDICARE

## 2025-05-27 VITALS
BODY MASS INDEX: 39.03 KG/M2 | WEIGHT: 278.8 LBS | OXYGEN SATURATION: 95 % | DIASTOLIC BLOOD PRESSURE: 90 MMHG | TEMPERATURE: 97.5 F | HEART RATE: 71 BPM | SYSTOLIC BLOOD PRESSURE: 160 MMHG | HEIGHT: 71 IN

## 2025-05-27 DIAGNOSIS — R39.11 BENIGN PROSTATIC HYPERPLASIA WITH URINARY HESITANCY: ICD-10-CM

## 2025-05-27 DIAGNOSIS — F14.20: ICD-10-CM

## 2025-05-27 DIAGNOSIS — F17.200 SMOKER: ICD-10-CM

## 2025-05-27 DIAGNOSIS — K02.9 DENTAL CARIES: ICD-10-CM

## 2025-05-27 DIAGNOSIS — I10 PRIMARY HYPERTENSION: Primary | ICD-10-CM

## 2025-05-27 DIAGNOSIS — E66.01 OBESITY, MORBID (MULTI): ICD-10-CM

## 2025-05-27 DIAGNOSIS — F33.2 SEVERE EPISODE OF RECURRENT MAJOR DEPRESSIVE DISORDER, WITHOUT PSYCHOTIC FEATURES (MULTI): ICD-10-CM

## 2025-05-27 DIAGNOSIS — F14.220: ICD-10-CM

## 2025-05-27 DIAGNOSIS — F12.20 CANNABIS USE DISORDER, SEVERE: ICD-10-CM

## 2025-05-27 DIAGNOSIS — N40.1 BENIGN PROSTATIC HYPERPLASIA WITH URINARY HESITANCY: ICD-10-CM

## 2025-05-27 DIAGNOSIS — F19.10 DRUG ABUSE: ICD-10-CM

## 2025-05-27 PROCEDURE — 3008F BODY MASS INDEX DOCD: CPT | Performed by: INTERNAL MEDICINE

## 2025-05-27 PROCEDURE — 3080F DIAST BP >= 90 MM HG: CPT | Performed by: INTERNAL MEDICINE

## 2025-05-27 PROCEDURE — G2211 COMPLEX E/M VISIT ADD ON: HCPCS | Performed by: INTERNAL MEDICINE

## 2025-05-27 PROCEDURE — 3077F SYST BP >= 140 MM HG: CPT | Performed by: INTERNAL MEDICINE

## 2025-05-27 PROCEDURE — 99213 OFFICE O/P EST LOW 20 MIN: CPT | Performed by: INTERNAL MEDICINE

## 2025-05-27 RX ORDER — BISOPROLOL FUMARATE AND HYDROCHLOROTHIAZIDE 5; 6.25 MG/1; MG/1
1 TABLET ORAL DAILY
Qty: 90 TABLET | Refills: 1 | Status: SHIPPED | OUTPATIENT
Start: 2025-05-27 | End: 2026-05-27

## 2025-05-27 ASSESSMENT — PATIENT HEALTH QUESTIONNAIRE - PHQ9
1. LITTLE INTEREST OR PLEASURE IN DOING THINGS: NOT AT ALL
SUM OF ALL RESPONSES TO PHQ9 QUESTIONS 1 AND 2: 0
2. FEELING DOWN, DEPRESSED OR HOPELESS: NOT AT ALL

## 2025-05-27 NOTE — PROGRESS NOTES
"Subjective   Patient ID: Cb Richardson is a 55 y.o. male who presents for Follow-up (6 mo) and Pre-op Exam (Clearance for dental work).    HPI needs teeth removed   Bp is high   Says taking meds  See drug screen pos   Heart does flutter comes and goes       Review of Systems    Objective   /90 (BP Location: Left arm, Patient Position: Sitting, BP Cuff Size: Adult)   Pulse 71   Temp 36.4 °C (97.5 °F) (Temporal)   Ht 1.803 m (5' 11\")   Wt 126 kg (278 lb 12.8 oz)   SpO2 95%   BMI 38.88 kg/m²     Physical Exam  NAD obese   CARD RRR  PULM CTA  ABD NEG   EXT  NL    Assessment/Plan   Diagnoses and all orders for this visit:  Primary hypertension  Comments:  lets change meds  Benign prostatic hyperplasia with urinary hesitancy  Smoker  Comments:  ongoing  Drug abuse  Comments:  see tox screen  BMI 38.0-38.9,adult  Dental caries  Comments:  needs removed  Cocaine dependence, continuous (Multi)  Cocaine dependence with intoxication and without complication (Multi)  Cannabis use disorder, severe  Severe episode of recurrent major depressive disorder, without psychotic features (Multi)  Obesity, morbid (Multi)    OK FOR DENTAL EXTRACTIONS      "

## 2025-07-18 ENCOUNTER — PATIENT OUTREACH (OUTPATIENT)
Dept: PRIMARY CARE | Facility: CLINIC | Age: 55
End: 2025-07-18
Payer: MEDICARE

## 2025-09-04 DIAGNOSIS — F17.200 SMOKER: ICD-10-CM

## 2025-09-04 DIAGNOSIS — N40.1 BENIGN PROSTATIC HYPERPLASIA WITH URINARY HESITANCY: ICD-10-CM

## 2025-09-04 DIAGNOSIS — E78.2 MIXED HYPERLIPIDEMIA: ICD-10-CM

## 2025-09-04 DIAGNOSIS — K02.9 DENTAL CARIES: ICD-10-CM

## 2025-09-04 DIAGNOSIS — I10 PRIMARY HYPERTENSION: ICD-10-CM

## 2025-09-04 DIAGNOSIS — M48.02 CERVICAL STENOSIS OF SPINAL CANAL: ICD-10-CM

## 2025-09-04 DIAGNOSIS — E66.01 OBESITY, MORBID (MULTI): ICD-10-CM

## 2025-09-04 DIAGNOSIS — F19.10 DRUG ABUSE: ICD-10-CM

## 2025-09-04 DIAGNOSIS — F12.20 CANNABIS USE DISORDER, SEVERE: ICD-10-CM

## 2025-09-04 DIAGNOSIS — F14.220: ICD-10-CM

## 2025-09-04 DIAGNOSIS — G45.9 TIA (TRANSIENT ISCHEMIC ATTACK): ICD-10-CM

## 2025-09-04 DIAGNOSIS — F33.2 SEVERE EPISODE OF RECURRENT MAJOR DEPRESSIVE DISORDER, WITHOUT PSYCHOTIC FEATURES (MULTI): ICD-10-CM

## 2025-09-04 DIAGNOSIS — R39.11 BENIGN PROSTATIC HYPERPLASIA WITH URINARY HESITANCY: ICD-10-CM

## 2025-09-04 DIAGNOSIS — N40.1 BENIGN PROSTATIC HYPERPLASIA WITH LOWER URINARY TRACT SYMPTOMS: ICD-10-CM

## 2025-09-04 DIAGNOSIS — F14.20: ICD-10-CM

## 2025-09-04 RX ORDER — ATORVASTATIN CALCIUM 40 MG/1
40 TABLET, FILM COATED ORAL DAILY
Qty: 90 TABLET | Refills: 3 | Status: SHIPPED | OUTPATIENT
Start: 2025-09-04

## 2025-09-04 RX ORDER — BISOPROLOL FUMARATE AND HYDROCHLOROTHIAZIDE 5; 6.25 MG/1; MG/1
1 TABLET ORAL DAILY
Qty: 90 TABLET | Refills: 1 | OUTPATIENT
Start: 2025-09-04 | End: 2026-09-04

## 2025-09-04 RX ORDER — BISOPROLOL FUMARATE AND HYDROCHLOROTHIAZIDE 5; 6.25 MG/1; MG/1
TABLET ORAL
Refills: 0 | OUTPATIENT
Start: 2025-09-04

## 2025-09-04 RX ORDER — TAMSULOSIN HYDROCHLORIDE 0.4 MG/1
0.4 CAPSULE ORAL NIGHTLY
Qty: 90 CAPSULE | Refills: 3 | Status: SHIPPED | OUTPATIENT
Start: 2025-09-04